# Patient Record
Sex: MALE | Race: WHITE | NOT HISPANIC OR LATINO | Employment: OTHER
[De-identification: names, ages, dates, MRNs, and addresses within clinical notes are randomized per-mention and may not be internally consistent; named-entity substitution may affect disease eponyms.]

---

## 2018-03-14 PROCEDURE — 82570 ASSAY OF URINE CREATININE: CPT | Performed by: INTERNAL MEDICINE

## 2018-03-14 PROCEDURE — 82043 UR ALBUMIN QUANTITATIVE: CPT | Performed by: INTERNAL MEDICINE

## 2018-05-15 ENCOUNTER — HOSPITAL (OUTPATIENT)
Dept: OTHER | Age: 58
End: 2018-05-15
Attending: EMERGENCY MEDICINE

## 2018-05-15 LAB
ALBUMIN SERPL-MCNC: 3.7 GM/DL (ref 3.6–5.1)
ALBUMIN/GLOB SERPL: 1.1 {RATIO} (ref 1–2.4)
ALP SERPL-CCNC: 80 UNIT/L (ref 45–117)
ALT SERPL-CCNC: 20 UNIT/L
ANALYZER ANC (IANC): ABNORMAL
ANION GAP SERPL CALC-SCNC: 11 MMOL/L (ref 10–20)
APPEARANCE UR: CLEAR
AST SERPL-CCNC: 8 UNIT/L
BASOPHILS # BLD: 0 THOUSAND/MCL (ref 0–0.3)
BASOPHILS NFR BLD: 0 %
BILIRUB SERPL-MCNC: 0.4 MG/DL (ref 0.2–1)
BILIRUB UR QL STRIP: ABNORMAL
BUN SERPL-MCNC: 19 MG/DL (ref 6–20)
BUN/CREAT SERPL: 26 (ref 7–25)
CALCIUM SERPL-MCNC: 9.2 MG/DL (ref 8.4–10.2)
CHLORIDE: 101 MMOL/L (ref 98–107)
CO2 SERPL-SCNC: 30 MMOL/L (ref 21–32)
COLOR UR: YELLOW
CREAT SERPL-MCNC: 0.73 MG/DL (ref 0.67–1.17)
DIFFERENTIAL METHOD BLD: ABNORMAL
EOSINOPHIL # BLD: 0.3 THOUSAND/MCL (ref 0.1–0.5)
EOSINOPHIL NFR BLD: 3 %
ERYTHROCYTE [DISTWIDTH] IN BLOOD: 12.7 % (ref 11–15)
ERYTHROCYTE [SEDIMENTATION RATE] IN BLOOD BY WESTERGREN METHOD: 9 MM/HR (ref 0–20)
GLOBULIN SER-MCNC: 3.4 GM/DL (ref 2–4)
GLUCOSE SERPL-MCNC: 177 MG/DL (ref 65–99)
GLUCOSE UR STRIP-MCNC: NEGATIVE MG/DL
HEMATOCRIT: 46.7 % (ref 39–51)
HEMOCCULT STL QL: NEGATIVE
HGB BLD-MCNC: 16.6 GM/DL (ref 13–17)
KETONES UR STRIP-MCNC: NEGATIVE MG/DL
LEUKOCYTE ESTERASE UR QL STRIP: NEGATIVE
LIPASE SERPL-CCNC: 182 UNIT/L (ref 73–393)
LYMPHOCYTES # BLD: 3.2 THOUSAND/MCL (ref 1–4)
LYMPHOCYTES NFR BLD: 31 %
MCH RBC QN AUTO: 31.5 PG (ref 26–34)
MCHC RBC AUTO-ENTMCNC: 35.5 GM/DL (ref 32–36.5)
MCV RBC AUTO: 88.6 FL (ref 78–100)
MONOCYTES # BLD: 1 THOUSAND/MCL (ref 0.3–0.9)
MONOCYTES NFR BLD: 10 %
NEUTROPHILS # BLD: 5.7 THOUSAND/MCL (ref 1.8–7.7)
NEUTROPHILS NFR BLD: 56 %
NEUTS SEG NFR BLD: ABNORMAL %
NITRITE UR QL STRIP: NEGATIVE
PERCENT NRBC: ABNORMAL
PH UR STRIP: 6.5 UNIT (ref 5–7)
PLATELET # BLD: 229 THOUSAND/MCL (ref 140–450)
POTASSIUM SERPL-SCNC: 4.1 MMOL/L (ref 3.4–5.1)
PROT SERPL-MCNC: 7.1 GM/DL (ref 6.4–8.2)
PROT UR STRIP-MCNC: 30 MG/DL
RBC # BLD: 5.27 MILLION/MCL (ref 4.5–5.9)
SODIUM SERPL-SCNC: 138 MMOL/L (ref 135–145)
SP GR UR STRIP: 1.02 (ref 1–1.03)
TROPONIN I SERPL HS-MCNC: <0.02 NG/ML
UROBILINOGEN UR STRIP-MCNC: 1 MG/DL (ref 0–1)
WBC # BLD: 10.1 THOUSAND/MCL (ref 4.2–11)

## 2018-05-16 ENCOUNTER — DIAGNOSTIC TRANS (OUTPATIENT)
Dept: OTHER | Age: 58
End: 2018-05-16

## 2018-07-20 ENCOUNTER — HOSPITAL (OUTPATIENT)
Dept: OTHER | Age: 58
End: 2018-07-20
Attending: EMERGENCY MEDICINE

## 2018-07-20 LAB
ALBUMIN SERPL-MCNC: 3.5 GM/DL (ref 3.6–5.1)
ALP SERPL-CCNC: 71 UNIT/L (ref 45–117)
ALT SERPL-CCNC: 17 UNIT/L
ANALYZER ANC (IANC): ABNORMAL
ANION GAP SERPL CALC-SCNC: 12 MMOL/L (ref 10–20)
AST SERPL-CCNC: 10 UNIT/L
BASOPHILS # BLD: 0 THOUSAND/MCL (ref 0–0.3)
BASOPHILS NFR BLD: 0 %
BILIRUB CONJ SERPL-MCNC: 0.1 MG/DL (ref 0–0.2)
BILIRUB SERPL-MCNC: 0.5 MG/DL (ref 0.2–1)
BUN SERPL-MCNC: 16 MG/DL (ref 6–20)
BUN/CREAT SERPL: 23 (ref 7–25)
CALCIUM SERPL-MCNC: 8.6 MG/DL (ref 8.4–10.2)
CHLORIDE: 104 MMOL/L (ref 98–107)
CO2 SERPL-SCNC: 29 MMOL/L (ref 21–32)
CREAT SERPL-MCNC: 0.7 MG/DL (ref 0.67–1.17)
DIFFERENTIAL METHOD BLD: ABNORMAL
EOSINOPHIL # BLD: 0.4 THOUSAND/MCL (ref 0.1–0.5)
EOSINOPHIL NFR BLD: 4 %
ERYTHROCYTE [DISTWIDTH] IN BLOOD: 12.5 % (ref 11–15)
GLUCOSE SERPL-MCNC: 141 MG/DL (ref 65–99)
HEMATOCRIT: 44.8 % (ref 39–51)
HGB BLD-MCNC: 16 GM/DL (ref 13–17)
LIPASE SERPL-CCNC: 75 UNIT/L (ref 73–393)
LYMPHOCYTES # BLD: 2.7 THOUSAND/MCL (ref 1–4)
LYMPHOCYTES NFR BLD: 28 %
MCH RBC QN AUTO: 32 PG (ref 26–34)
MCHC RBC AUTO-ENTMCNC: 35.7 GM/DL (ref 32–36.5)
MCV RBC AUTO: 89.6 FL (ref 78–100)
MONOCYTES # BLD: 1 THOUSAND/MCL (ref 0.3–0.9)
MONOCYTES NFR BLD: 10 %
NEUTROPHILS # BLD: 5.7 THOUSAND/MCL (ref 1.8–7.7)
NEUTROPHILS NFR BLD: 58 %
NEUTS SEG NFR BLD: ABNORMAL %
NRBC (NRBCRE): ABNORMAL
PLATELET # BLD: 203 THOUSAND/MCL (ref 140–450)
POTASSIUM SERPL-SCNC: 4.1 MMOL/L (ref 3.4–5.1)
PROT SERPL-MCNC: 7 GM/DL (ref 6.4–8.2)
RBC # BLD: 5 MILLION/MCL (ref 4.5–5.9)
SODIUM SERPL-SCNC: 141 MMOL/L (ref 135–145)
WBC # BLD: 9.8 THOUSAND/MCL (ref 4.2–11)

## 2018-07-21 LAB
APPEARANCE UR: CLEAR
BILIRUB UR QL STRIP: NEGATIVE
COLOR UR: YELLOW
GLUCOSE UR STRIP-MCNC: 100 MG/DL
HEMOCCULT STL QL: NEGATIVE
KETONES UR STRIP-MCNC: NEGATIVE MG/DL
LEUKOCYTE ESTERASE UR QL STRIP: NEGATIVE
NITRITE UR QL STRIP: NEGATIVE
PH UR STRIP: 6 UNIT (ref 5–7)
PROT UR STRIP-MCNC: NEGATIVE MG/DL
SP GR UR STRIP: 1.02 (ref 1–1.03)
UROBILINOGEN UR STRIP-MCNC: 0.2 MG/DL (ref 0–1)

## 2018-11-06 PROCEDURE — 86334 IMMUNOFIX E-PHORESIS SERUM: CPT | Performed by: OTHER

## 2018-11-06 PROCEDURE — 84165 PROTEIN E-PHORESIS SERUM: CPT | Performed by: OTHER

## 2018-11-06 PROCEDURE — 83883 ASSAY NEPHELOMETRY NOT SPEC: CPT | Performed by: OTHER

## 2019-07-08 PROBLEM — Z28.20 VACCINE REFUSED BY PATIENT: Status: ACTIVE | Noted: 2019-07-08

## 2019-07-08 PROBLEM — Z28.21 REFUSED PNEUMOCOCCAL VACCINE: Status: ACTIVE | Noted: 2019-07-08

## 2019-07-08 PROBLEM — Z00.00 ROUTINE GENERAL MEDICAL EXAMINATION AT A HEALTH CARE FACILITY: Status: ACTIVE | Noted: 2019-07-08

## 2019-07-08 PROBLEM — Z53.20 COLONOSCOPY REFUSED: Status: ACTIVE | Noted: 2019-07-08

## 2019-07-11 PROBLEM — E11.65 UNCONTROLLED TYPE 2 DIABETES MELLITUS WITH HYPERGLYCEMIA (HCC): Status: ACTIVE | Noted: 2019-07-11

## 2019-07-12 PROCEDURE — 86803 HEPATITIS C AB TEST: CPT | Performed by: NURSE PRACTITIONER

## 2021-01-13 ENCOUNTER — APPOINTMENT (OUTPATIENT)
Dept: ULTRASOUND IMAGING | Facility: HOSPITAL | Age: 61
End: 2021-01-13
Attending: EMERGENCY MEDICINE
Payer: MEDICARE

## 2021-01-13 ENCOUNTER — APPOINTMENT (OUTPATIENT)
Dept: GENERAL RADIOLOGY | Facility: HOSPITAL | Age: 61
End: 2021-01-13
Attending: EMERGENCY MEDICINE
Payer: MEDICARE

## 2021-01-13 ENCOUNTER — HOSPITAL ENCOUNTER (EMERGENCY)
Facility: HOSPITAL | Age: 61
Discharge: HOME | End: 2021-01-13
Attending: EMERGENCY MEDICINE
Payer: MEDICARE

## 2021-01-13 VITALS
HEART RATE: 106 BPM | DIASTOLIC BLOOD PRESSURE: 91 MMHG | HEIGHT: 71 IN | TEMPERATURE: 98 F | RESPIRATION RATE: 22 BRPM | OXYGEN SATURATION: 98 % | WEIGHT: 200 LBS | SYSTOLIC BLOOD PRESSURE: 146 MMHG | BODY MASS INDEX: 28 KG/M2

## 2021-01-13 DIAGNOSIS — M25.562 ARTHRALGIA OF LEFT LOWER LEG: Primary | ICD-10-CM

## 2021-01-13 DIAGNOSIS — E11.65 TYPE 2 DIABETES MELLITUS WITH HYPERGLYCEMIA, WITHOUT LONG-TERM CURRENT USE OF INSULIN (HCC): ICD-10-CM

## 2021-01-13 LAB
ALBUMIN SERPL-MCNC: 3.5 G/DL (ref 3.4–5)
ALBUMIN/GLOB SERPL: 0.8 {RATIO} (ref 1–2)
ALP LIVER SERPL-CCNC: 102 U/L
ALT SERPL-CCNC: 16 U/L
ANION GAP SERPL CALC-SCNC: 6 MMOL/L (ref 0–18)
AST SERPL-CCNC: 10 U/L (ref 15–37)
BASOPHILS # BLD AUTO: 0.05 X10(3) UL (ref 0–0.2)
BASOPHILS NFR BLD AUTO: 0.4 %
BILIRUB SERPL-MCNC: 0.5 MG/DL (ref 0.1–2)
BUN BLD-MCNC: 10 MG/DL (ref 7–18)
BUN/CREAT SERPL: 10.9 (ref 10–20)
CALCIUM BLD-MCNC: 9.1 MG/DL (ref 8.5–10.1)
CHLORIDE SERPL-SCNC: 100 MMOL/L (ref 98–112)
CO2 SERPL-SCNC: 27 MMOL/L (ref 21–32)
CREAT BLD-MCNC: 0.92 MG/DL
DEPRECATED RDW RBC AUTO: 41.1 FL (ref 35.1–46.3)
EOSINOPHIL # BLD AUTO: 0.36 X10(3) UL (ref 0–0.7)
EOSINOPHIL NFR BLD AUTO: 3 %
ERYTHROCYTE [DISTWIDTH] IN BLOOD BY AUTOMATED COUNT: 13.2 % (ref 11–15)
GLOBULIN PLAS-MCNC: 4.2 G/DL (ref 2.8–4.4)
GLUCOSE BLD-MCNC: 308 MG/DL (ref 70–99)
GLUCOSE BLD-MCNC: 65 MG/DL (ref 70–99)
HCT VFR BLD AUTO: 54.2 %
HGB BLD-MCNC: 18.5 G/DL
IMM GRANULOCYTES # BLD AUTO: 0.07 X10(3) UL (ref 0–1)
IMM GRANULOCYTES NFR BLD: 0.6 %
LYMPHOCYTES # BLD AUTO: 2.26 X10(3) UL (ref 1–4)
LYMPHOCYTES NFR BLD AUTO: 19 %
M PROTEIN MFR SERPL ELPH: 7.7 G/DL (ref 6.4–8.2)
MCH RBC QN AUTO: 29.8 PG (ref 26–34)
MCHC RBC AUTO-ENTMCNC: 34.1 G/DL (ref 31–37)
MCV RBC AUTO: 87.3 FL
MONOCYTES # BLD AUTO: 1.08 X10(3) UL (ref 0.1–1)
MONOCYTES NFR BLD AUTO: 9.1 %
NEUTROPHILS # BLD AUTO: 8.06 X10 (3) UL (ref 1.5–7.7)
NEUTROPHILS # BLD AUTO: 8.06 X10(3) UL (ref 1.5–7.7)
NEUTROPHILS NFR BLD AUTO: 67.9 %
OSMOLALITY SERPL CALC.SUM OF ELEC: 287 MOSM/KG (ref 275–295)
PLATELET # BLD AUTO: 188 10(3)UL (ref 150–450)
POTASSIUM SERPL-SCNC: 4.2 MMOL/L (ref 3.5–5.1)
RBC # BLD AUTO: 6.21 X10(6)UL
SODIUM SERPL-SCNC: 133 MMOL/L (ref 136–145)
WBC # BLD AUTO: 11.9 X10(3) UL (ref 4–11)

## 2021-01-13 PROCEDURE — 93971 EXTREMITY STUDY: CPT | Performed by: EMERGENCY MEDICINE

## 2021-01-13 PROCEDURE — 96374 THER/PROPH/DIAG INJ IV PUSH: CPT

## 2021-01-13 PROCEDURE — 99285 EMERGENCY DEPT VISIT HI MDM: CPT

## 2021-01-13 PROCEDURE — 96361 HYDRATE IV INFUSION ADD-ON: CPT

## 2021-01-13 PROCEDURE — 82962 GLUCOSE BLOOD TEST: CPT

## 2021-01-13 PROCEDURE — 80053 COMPREHEN METABOLIC PANEL: CPT | Performed by: EMERGENCY MEDICINE

## 2021-01-13 PROCEDURE — 73590 X-RAY EXAM OF LOWER LEG: CPT | Performed by: EMERGENCY MEDICINE

## 2021-01-13 PROCEDURE — 96376 TX/PRO/DX INJ SAME DRUG ADON: CPT

## 2021-01-13 PROCEDURE — 85025 COMPLETE CBC W/AUTO DIFF WBC: CPT | Performed by: EMERGENCY MEDICINE

## 2021-01-13 PROCEDURE — 93005 ELECTROCARDIOGRAM TRACING: CPT

## 2021-01-13 PROCEDURE — 93010 ELECTROCARDIOGRAM REPORT: CPT

## 2021-01-13 RX ORDER — INSULIN ASPART 100 [IU]/ML
0.15 INJECTION, SOLUTION INTRAVENOUS; SUBCUTANEOUS ONCE
Status: COMPLETED | OUTPATIENT
Start: 2021-01-13 | End: 2021-01-13

## 2021-01-13 RX ORDER — HYDROMORPHONE HYDROCHLORIDE 1 MG/ML
0.5 INJECTION, SOLUTION INTRAMUSCULAR; INTRAVENOUS; SUBCUTANEOUS EVERY 30 MIN PRN
Status: DISCONTINUED | OUTPATIENT
Start: 2021-01-13 | End: 2021-01-13

## 2021-01-13 RX ORDER — SODIUM CHLORIDE 9 MG/ML
125 INJECTION, SOLUTION INTRAVENOUS CONTINUOUS
Status: DISCONTINUED | OUTPATIENT
Start: 2021-01-13 | End: 2021-01-13

## 2021-01-13 NOTE — ED INITIAL ASSESSMENT (HPI)
Patient has had pain in left leg that has progressed since thanksgiving. Patient states that the pain is now traveling from his leg all the way up to his neck. Patient also thinks he had a seizure 2 days ago.

## 2021-01-14 LAB
ATRIAL RATE: 113 BPM
P AXIS: 78 DEGREES
P-R INTERVAL: 140 MS
Q-T INTERVAL: 334 MS
QRS DURATION: 92 MS
QTC CALCULATION (BEZET): 458 MS
R AXIS: 66 DEGREES
T AXIS: 51 DEGREES
VENTRICULAR RATE: 113 BPM

## 2021-01-14 NOTE — ED NOTES
Pt not agreeable to accucheck being done. Pt getting dressed and states he needs another dose of pain medication before he goes home.  RN asks patient if he has a ride home as pain medication can not be given unless pt has a ride home that is visualized by

## 2021-01-14 NOTE — ED NOTES
Pt pulled call light after finishing in the restroom.  Pt stated he did not want to go back the shortest direction to his room because it would require him to look at the PCT who \"touched his face without gloves on\", followed by name calling of various co

## 2021-01-14 NOTE — ED NOTES
After walking in to the pt room I introduced myself and started to put gloves on to take an accucheck. The pt mask was under his nose and I asked him to pull it up, The pt stated he needed more pain meds.  I let the pt know I would inform the nurse right af

## 2021-01-14 NOTE — ED NOTES
This CL/Charge RN asked to speak with patient regarding a concern. Patient using obscene language regarding situation and staff, calling staff names, stating he does not care about the use of his language.  States upset with PCT when patient mask was not [facility]

## 2021-01-14 NOTE — ED PROVIDER NOTES
Patient Seen in: [facility] Emergency Department      History   Patient presents with:  Pain    Stated Complaint: pain to leg    HPI/Subjective:   HPI    Patient is a 68-year-old male who has a history of bilateral leg pain secondary to diabetic ying [01/13/21 1625]   BP (!) 180/156   Pulse 120   Resp 20   Temp 98.1 °F (36.7 °C)   Temp src Temporal   SpO2 98 %   O2 Device None (Room air)       Current:BP (!) 146/91   Pulse 106   Temp 98.1 °F (36.7 °C) (Temporal)   Resp 22   Ht 180.3 cm (5' 11\")   Wt 9 orders were created for panel order CBC WITH DIFFERENTIAL WITH PLATELET.   Procedure                               Abnormality         Status                     ---------                               -----------         ------                     CBC W/ D days      Cesar Beckford MD  [address] [facility] [address] [phone]    In 2 days            Medications Prescribed:  Discharge Medication List as of 1/13/2021  8:14 PM

## 2021-11-22 ENCOUNTER — APPOINTMENT (OUTPATIENT)
Dept: GENERAL RADIOLOGY | Facility: HOSPITAL | Age: 61
DRG: 066 | End: 2021-11-22
Attending: EMERGENCY MEDICINE
Payer: MEDICARE

## 2021-11-22 ENCOUNTER — APPOINTMENT (OUTPATIENT)
Dept: MRI IMAGING | Facility: HOSPITAL | Age: 61
DRG: 066 | End: 2021-11-22
Attending: EMERGENCY MEDICINE
Payer: MEDICARE

## 2021-11-22 ENCOUNTER — APPOINTMENT (OUTPATIENT)
Dept: CT IMAGING | Facility: HOSPITAL | Age: 61
DRG: 066 | End: 2021-11-22
Attending: EMERGENCY MEDICINE
Payer: MEDICARE

## 2021-11-22 ENCOUNTER — HOSPITAL ENCOUNTER (INPATIENT)
Facility: HOSPITAL | Age: 61
LOS: 1 days | Discharge: LEFT BEFORE DISCHARGE | DRG: 066 | End: 2021-11-23
Attending: EMERGENCY MEDICINE | Admitting: INTERNAL MEDICINE
Payer: MEDICARE

## 2021-11-22 DIAGNOSIS — I63.9 ACUTE CVA (CEREBROVASCULAR ACCIDENT) (HCC): Primary | ICD-10-CM

## 2021-11-22 PROCEDURE — 70546 MR ANGIOGRAPH HEAD W/O&W/DYE: CPT | Performed by: EMERGENCY MEDICINE

## 2021-11-22 PROCEDURE — 71045 X-RAY EXAM CHEST 1 VIEW: CPT | Performed by: EMERGENCY MEDICINE

## 2021-11-22 PROCEDURE — 70450 CT HEAD/BRAIN W/O DYE: CPT | Performed by: EMERGENCY MEDICINE

## 2021-11-22 PROCEDURE — 70553 MRI BRAIN STEM W/O & W/DYE: CPT | Performed by: EMERGENCY MEDICINE

## 2021-11-22 PROCEDURE — 70549 MR ANGIOGRAPH NECK W/O&W/DYE: CPT | Performed by: EMERGENCY MEDICINE

## 2021-11-22 RX ORDER — DEXTROSE MONOHYDRATE 25 G/50ML
50 INJECTION, SOLUTION INTRAVENOUS
Status: DISCONTINUED | OUTPATIENT
Start: 2021-11-22 | End: 2021-11-24

## 2021-11-22 RX ORDER — HYDROCODONE BITARTRATE AND ACETAMINOPHEN 5; 325 MG/1; MG/1
1 TABLET ORAL EVERY 4 HOURS PRN
Status: DISCONTINUED | OUTPATIENT
Start: 2021-11-22 | End: 2021-11-24

## 2021-11-22 RX ORDER — ACETAMINOPHEN 325 MG/1
650 TABLET ORAL EVERY 4 HOURS PRN
Status: DISCONTINUED | OUTPATIENT
Start: 2021-11-22 | End: 2021-11-24

## 2021-11-22 RX ORDER — ONDANSETRON 2 MG/ML
4 INJECTION INTRAMUSCULAR; INTRAVENOUS EVERY 4 HOURS PRN
Status: DISCONTINUED | OUTPATIENT
Start: 2021-11-22 | End: 2021-11-22

## 2021-11-22 RX ORDER — HYDROCODONE BITARTRATE AND ACETAMINOPHEN 5; 325 MG/1; MG/1
2 TABLET ORAL EVERY 4 HOURS PRN
Status: DISCONTINUED | OUTPATIENT
Start: 2021-11-22 | End: 2021-11-24

## 2021-11-22 RX ORDER — NICOTINE 21 MG/24HR
1 PATCH, TRANSDERMAL 24 HOURS TRANSDERMAL DAILY
Status: DISCONTINUED | OUTPATIENT
Start: 2021-11-22 | End: 2021-11-24

## 2021-11-22 RX ORDER — HYDRALAZINE HYDROCHLORIDE 20 MG/ML
10 INJECTION INTRAMUSCULAR; INTRAVENOUS EVERY 2 HOUR PRN
Status: DISCONTINUED | OUTPATIENT
Start: 2021-11-22 | End: 2021-11-24

## 2021-11-22 RX ORDER — SODIUM CHLORIDE 9 MG/ML
INJECTION, SOLUTION INTRAVENOUS CONTINUOUS
Status: DISCONTINUED | OUTPATIENT
Start: 2021-11-22 | End: 2021-11-23

## 2021-11-22 RX ORDER — ASPIRIN 300 MG
300 SUPPOSITORY, RECTAL RECTAL ONCE
Status: COMPLETED | OUTPATIENT
Start: 2021-11-22 | End: 2021-11-22

## 2021-11-22 RX ORDER — ASPIRIN 81 MG/1
324 TABLET, CHEWABLE ORAL ONCE
Status: DISCONTINUED | OUTPATIENT
Start: 2021-11-22 | End: 2021-11-22

## 2021-11-22 RX ORDER — DOCUSATE SODIUM 100 MG/1
100 CAPSULE, LIQUID FILLED ORAL 2 TIMES DAILY PRN
Status: DISCONTINUED | OUTPATIENT
Start: 2021-11-22 | End: 2021-11-24

## 2021-11-22 RX ORDER — ONDANSETRON 2 MG/ML
4 INJECTION INTRAMUSCULAR; INTRAVENOUS EVERY 6 HOURS PRN
Status: DISCONTINUED | OUTPATIENT
Start: 2021-11-22 | End: 2021-11-24

## 2021-11-22 RX ORDER — ASPIRIN 325 MG
325 TABLET ORAL DAILY
Status: DISCONTINUED | OUTPATIENT
Start: 2021-11-23 | End: 2021-11-24

## 2021-11-22 RX ORDER — ASPIRIN 300 MG
300 SUPPOSITORY, RECTAL RECTAL DAILY
Status: DISCONTINUED | OUTPATIENT
Start: 2021-11-23 | End: 2021-11-24

## 2021-11-22 RX ORDER — ACETAMINOPHEN 650 MG/1
650 SUPPOSITORY RECTAL EVERY 4 HOURS PRN
Status: DISCONTINUED | OUTPATIENT
Start: 2021-11-22 | End: 2021-11-24

## 2021-11-22 RX ORDER — METOCLOPRAMIDE HYDROCHLORIDE 5 MG/ML
10 INJECTION INTRAMUSCULAR; INTRAVENOUS EVERY 8 HOURS PRN
Status: DISCONTINUED | OUTPATIENT
Start: 2021-11-22 | End: 2021-11-24

## 2021-11-22 RX ORDER — LABETALOL HYDROCHLORIDE 5 MG/ML
10 INJECTION, SOLUTION INTRAVENOUS EVERY 10 MIN PRN
Status: DISCONTINUED | OUTPATIENT
Start: 2021-11-22 | End: 2021-11-24

## 2021-11-22 NOTE — SLP NOTE
ADULT SWALLOWING EVALUATION    ASSESSMENT    ASSESSMENT/OVERALL IMPRESSION:  Patient is a 58 y/o male admitted with stroke-like symptoms and PMHx significant for MI and HLD. SLP order received to evaluate and treat d/t failed RN dysphagia screen.  Patient d Renal disorder    • Spongiotic dermatitis 10/7/2013          Diet Prior to Admission: Regular; Thin liquids       Patient/Family Goals: none stated    SWALLOWING HISTORY  Current Diet Consistency: NPO  Dysphagia History: as above  Imaging Results:   CXR 11/ contact Chucho Degree, SLP

## 2021-11-22 NOTE — ED QUICK NOTES
Orders for admission, patient is aware of plan and ready to go upstairs. Any questions, please call ED RN mariah  at extension [phone]. Vaccinated?  yes  Type of COVID test sent: rapid  COVID Suspicion level: Low      Titratable drug(s) infusing: none  Ra

## 2021-11-22 NOTE — ED INITIAL ASSESSMENT (HPI)
Pt here due to may be having a stroke last Friday. Pt stated that after being at the dentist he started having problems with being able to hold anything with his right hand. Today he can not use the right hand.

## 2021-11-22 NOTE — H&P
[facility] Hospitalist H&P       CC: Patient presents with:  Altered Mental Status  Stroke       PCP: Fernando Walsh MD    History of Present Illness: 60 y/o m w hx of dm, smoking p/w c/o right hand weakness, began 1 day ago, no fall, no cp/sob, some slurrin reviewed and negative except for what's stated above.      OBJECTIVE:  /90   Pulse 107   Temp 98.7 °F (37.1 °C) (Temporal)   Resp 16   Ht 5' 10\" (1.778 m)   Wt 210 lb (95.3 kg)   SpO2 97%   BMI 30.13 kg/m²   General:  Alert, no distress   Head:  Norm 11/22/2021  CONCLUSION:  Exam is within normal limits.     Dictated by (CST): Scar Saldivar DO on 11/22/2021 at 11:55 AM     Finalized by (CST): Scar Saldivar DO on 11/22/2021 at 11:56 AM           ASSESSMENT / PLAN:   60 y/o m w hx of dm, smoking p/w c/o r

## 2021-11-22 NOTE — ED PROVIDER NOTES
Patient Seen in: [facility] Emergency Department      History   Patient presents with:  Altered Mental Status  Stroke    Stated Complaint: R arm numbness since Thursday, confusion x 48 hrs    Subjective:   HPI    10-year-old male with past medical h reviewed and negative except as noted above.     Physical Exam     ED Triage Vitals [11/22/21 1114]   BP (!) 154/98   Pulse 108   Resp 18   Temp 98.7 °F (37.1 °C)   Temp src Temporal   SpO2 98 %   O2 Device None (Room air)       Current:BP (!) 146/91   Puls Neutrophil Absolute Prelim 8.79 (*)     Neutrophil Absolute 8.79 (*)     All other components within normal limits   ETHYL ALCOHOL - Normal   PROTHROMBIN TIME (PT) - Normal   PTT, ACTIVATED - Normal   RAPID SARS-COV-2 BY PCR - Normal   CBC WITH DIFFERENTIA potential venous thrombosis without MR venogram.  If clinically indicated, consider MR venogram.  No acute intracranial hemorrhage. No midline shift, mass effect, hydrocephalus, herniation, extra-axial fluid collections.   Mild generalized cerebral and cer some slurred speech. He seems to be communicating appropriately and understanding when spoken to as well. I do not appreciate any visual field cuts at this time. Plan for CT of the head to assess for intracranial normality such as CVA.  Will obtain basic la

## 2021-11-23 ENCOUNTER — APPOINTMENT (OUTPATIENT)
Dept: CV DIAGNOSTICS | Facility: HOSPITAL | Age: 61
DRG: 066 | End: 2021-11-23
Attending: HOSPITALIST
Payer: MEDICARE

## 2021-11-23 VITALS
SYSTOLIC BLOOD PRESSURE: 120 MMHG | HEART RATE: 96 BPM | TEMPERATURE: 99 F | RESPIRATION RATE: 24 BRPM | DIASTOLIC BLOOD PRESSURE: 68 MMHG | HEIGHT: 70 IN | OXYGEN SATURATION: 95 % | WEIGHT: 210 LBS | BODY MASS INDEX: 30.06 KG/M2

## 2021-11-23 PROCEDURE — 93306 TTE W/DOPPLER COMPLETE: CPT | Performed by: HOSPITALIST

## 2021-11-23 PROCEDURE — 99223 1ST HOSP IP/OBS HIGH 75: CPT | Performed by: OTHER

## 2021-11-23 RX ORDER — NITROGLYCERIN 0.4 MG/1
TABLET SUBLINGUAL
Status: COMPLETED
Start: 2021-11-23 | End: 2021-11-23

## 2021-11-23 NOTE — PHYSICAL THERAPY NOTE
PT lisha attempted this AM. During subjective pt reported L sided chest pain with centralized tingling. RN notified. Will hold until appropriate. Pt lives alone on the third floor without an elevator. Does not use or own any assistive devices.   Sister p

## 2021-11-23 NOTE — CONSULTS
[id number] [facility] Consultation Note    Date of consult: 11/23/2021    Reason for consult: stroke    HPI: Milton Jones is a 59year old male with past medical history of coronary artery disease, diabetes, tobacco use presents today for ri injection 10 mg, 10 mg, Intravenous, Q2H PRN  docusate sodium (COLACE) cap 100 mg, 100 mg, Oral, BID PRN  ondansetron (ZOFRAN) injection 4 mg, 4 mg, Intravenous, Q6H PRN   Or  metoclopramide (REGLAN) injection 10 mg, 10 mg, Intravenous, Q8H PRN  aspirin 30 rashes; no edema  Neurological Examination:  Language: Fluency with normal naming and repetition, comprehension normal  Speech: no dysarthria  CN: II-XII pupils 2-3 mm equal and reactive to light, mild facial asymetry  Motor strength: right UE subtle weakn

## 2021-11-23 NOTE — PLAN OF CARE
Assumed care at 1930  Pt A&Ox4   RA  NSR/ST on tele , VSS  Mild hip pain   No acute neuro changes   Sepsis BPA fired, hospitalist notified no new orders at this time   POC discussed with pt   Call light in reach, pt resting comfortably, will continue to mo

## 2021-11-23 NOTE — PROGRESS NOTES
[facility] Hospitalist Progress Note     CC: Hospital Follow up    PCP: Horacio Lucas MD       Assessment/Plan:     Principal Problem:    Acute CVA (cerebrovascular accident) ([facility] [address]    58 y/o m w hx of dm, smoking p/w c/o right hand weakness     Right hand wea NEPRELIM 8.79*  --    WBC 12.9* 10.0   .0 177.0         Recent Labs   Lab 11/22/21  1113   *   BUN 11   CREATSERUM 0.92   GFRAA 102   GFRNAA 88   CA 9.2      K 3.8      CO2 23.0       Recent Labs   Lab 11/22/21  1113   ALT 25 were discussed with Michelle Keating RN at 1645 hours on 11/22/2021. Critical results were read back.    Dictated by (CST): Golden Gonzalez MD on 11/22/2021 at 4:21 PM     Finalized by (CST): Golden Gonzalez MD on 11/22/2021 at 4:48 PM           Meds:     • Insu

## 2021-11-23 NOTE — PLAN OF CARE
NURSING ADMISSION NOTE      Patient admitted via Cart  Oriented to room. Safety precautions initiated. Bed in low position. Call light in reach.     Received patient A/Randi, ILA, BROOKS on tele around 1700  Admission navigator completed   Neuro barb maya

## 2021-11-23 NOTE — OCCUPATIONAL THERAPY NOTE
OCCUPATIONAL THERAPY                            Patient received in sitting, attempting to get out of bed. He was able to express himself with some word finding difficulty noted. Patient complained about RUE being weak and slow.   OT educated pa

## 2021-11-23 NOTE — PAYOR COMM NOTE
--------------  ADMISSION REVIEW     Fiorella Adams MA [facility]  Subscriber #:  [id number]  Authorization Number: [id number]    Admit date: 11/22/21  Admit time:  4:31 PM       REVIEW DOCUMENTATION:          Acute Left frontal Stroke  Sinus tachycardia  Chest pain asymmetrically smaller vessel compared to the prominent right V4 segment.                ED Provider Notes      ED Provider Notes signed by Ariana Mcnamara MD at 11/22/2021 12:51 PM     Author: Ariana Mcnamara MD Service: Kristen Iyer 1.50        Types: Cigarettes      Smokeless tobacco: Never Used      Tobacco comment: 1-2 PACKS DAILY    Vaping Use      Vaping Use: Never used    Alcohol use: Yes    Drug use: Not Currently             Review of Systems    Positive for stated complaint: components:       Result Value    Glucose 193 (*)     AST 11 (*)     A/G Ratio 0.8 (*)     All other components within normal limits   POCT GLUCOSE - Abnormal; Notable for the following components:    POC Glucose 191 (*)     All other components within nor convexity for example series 3, images 41-45, may be also involving the precentral gyrus. The sulci appear effaced with blurring of normally sharp margins. Concerning for recent infarct. Probable old subcortical infarct left frontal lobe image 35.   Dens on 11/22/2021 at 11:56 AM           MDM      42-year-old male presents today for evaluation of right-sided weakness. Patient's last normal was Thursday, approximately 3 days ago. His NIH stroke scale is 3.  Patient with some new sensory deficits on the rig 300 mg     Date Action Dose Route User    11/22/2021 1251 Given 300 mg Rectal Ken Falk, RN      aspirin tab 325 mg     Date Action Dose Route User    11/23/2021 0828 Given 325 mg Oral Dinorah Guzman, RN      Gadoterate Meglumine (DOTAREM) 10 Keo Rao °F (36.4 °C) 100 22 118/73 — — — — AB    11/22/21 2300 97.8 °F (36.6 °C) 102 25 109/60 95 % — None (Room air) — SV    11/22/21 2100 98 °F (36.7 °C) 113 22 133/72 95 % — None (Room air) — KR    11/22/21 1900 97.9 °F (36.6 °C) 110 22 137/82 94 % — None (Room

## 2021-11-23 NOTE — CONSULTS
[address] [facility]  Report of Consultation    Roderick Shore Patient Status:  Inpatient    3/24/1960 MRN [id number]   [facility] 7NE-A Attending Augusto Mendoza MD   Hosp Day # 1 PCP You Cohen MD smoking cigarettes. He has been smoking about 1.50 packs per day. He has never used smokeless tobacco. He reports current alcohol use. He reports previous drug use.     Allergies:    Enalapril               FATIGUE, ANXIETY  Lipitor [Atorvastat*    OTHER (S shortness of breath. Cardiovascular: Positive for chest pain and palpitations. Negative for leg swelling. Gastrointestinal: Negative for nausea, vomiting, diarrhea and abdominal distention. Genitourinary: Negative for dysuria.    Musculoskeletal: Neg Component Value Date    INR 0.94 11/22/2021     Lab Results   Component Value Date    TROP <0.017 04/01/2019         CT BRAIN OR HEAD (13636)    Result Date: 11/22/2021  CONCLUSION:  Suspicion for recent left-sided cerebral infarct as described.   Conside Finalized by (CST): Omar Cason MD on 11/22/2021 at 4:48 PM            Assessment and Plan    Acute Left frontal Stroke  Sinus tachycardia  Chest pain  CAD  Tobacco abuse  dyslpidemia    PLAn  -needs MCT to assess for underlying afib, given suspiciou

## 2021-11-24 NOTE — PLAN OF CARE
NURSING DISCHARGE NOTE    Pt behavior escalated, WANDER called, pt verbally abusive to staff. Pt alert and oriented x4, neuro check intact. Education of stroke and cardiology work up given with pt verbalizing understanding of risks if leaving.  Pt refused

## 2021-11-24 NOTE — PLAN OF CARE
Assumed care @0730   AxOx4, RA, NSR/ST  No new neuro deficits, baseline throughout shift   Echo performed  Chest pain reported in afternoon, stable vitals, cardiology aware  12 lead EKG performed, nitroglycerin given, troponin levels drawn  Large, loose st

## 2021-11-26 NOTE — PAYOR COMM NOTE
Discharge Notification    Patient Name: Xochitl Marie Dayton [facility]  Subscriber #: [id number]  Authorization Number: [id number]  Admit Date/Time: 11/22/2021 10:56 AM  Discharge Date/Time: 11/23/2021 9:20 PM

## 2022-08-15 ENCOUNTER — APPOINTMENT (OUTPATIENT)
Dept: MRI IMAGING | Age: 62
End: 2022-08-15
Attending: PSYCHIATRY & NEUROLOGY

## 2022-08-15 ENCOUNTER — HOSPITAL ENCOUNTER (EMERGENCY)
Age: 62
Discharge: HOME | End: 2022-08-15
Attending: EMERGENCY MEDICINE

## 2022-08-15 VITALS
HEIGHT: 71 IN | OXYGEN SATURATION: 98 % | RESPIRATION RATE: 18 BRPM | WEIGHT: 233.03 LBS | BODY MASS INDEX: 32.62 KG/M2 | DIASTOLIC BLOOD PRESSURE: 86 MMHG | HEART RATE: 93 BPM | TEMPERATURE: 98.1 F | SYSTOLIC BLOOD PRESSURE: 132 MMHG

## 2022-08-15 DIAGNOSIS — R79.89 ELEVATED TROPONIN: ICD-10-CM

## 2022-08-15 DIAGNOSIS — R46.89 AGGRESSIVE BEHAVIOR OF ADULT: ICD-10-CM

## 2022-08-15 DIAGNOSIS — Z79.4 UNCONTROLLED DIABETES MELLITUS WITH HYPERGLYCEMIA, WITH LONG-TERM CURRENT USE OF INSULIN (CMD): ICD-10-CM

## 2022-08-15 DIAGNOSIS — G51.0 BELL'S PALSY: Primary | ICD-10-CM

## 2022-08-15 DIAGNOSIS — E11.65 UNCONTROLLED DIABETES MELLITUS WITH HYPERGLYCEMIA, WITH LONG-TERM CURRENT USE OF INSULIN (CMD): ICD-10-CM

## 2022-08-15 DIAGNOSIS — R29.810 FACIAL DROOP: ICD-10-CM

## 2022-08-15 PROBLEM — I10 HTN (HYPERTENSION): Status: ACTIVE | Noted: 2022-08-15

## 2022-08-15 PROBLEM — I63.9 ACUTE CVA (CEREBROVASCULAR ACCIDENT) (CMD): Status: ACTIVE | Noted: 2021-11-22

## 2022-08-15 LAB
ALBUMIN SERPL-MCNC: 3.6 G/DL (ref 3.6–5.1)
ALBUMIN/GLOB SERPL: 0.9 {RATIO} (ref 1–2.4)
ALP SERPL-CCNC: 85 UNITS/L (ref 45–117)
ALT SERPL-CCNC: 34 UNITS/L
ANION GAP SERPL CALC-SCNC: 11 MMOL/L (ref 7–19)
APTT PPP: 27 SEC (ref 22–30)
AST SERPL-CCNC: 16 UNITS/L
BASOPHILS # BLD: 0.1 K/MCL (ref 0–0.3)
BASOPHILS NFR BLD: 1 %
BILIRUB SERPL-MCNC: 0.7 MG/DL (ref 0.2–1)
BUN SERPL-MCNC: 12 MG/DL (ref 6–20)
BUN/CREAT SERPL: 11 (ref 7–25)
CALCIUM SERPL-MCNC: 8.8 MG/DL (ref 8.4–10.2)
CHLORIDE SERPL-SCNC: 103 MMOL/L (ref 97–110)
CO2 SERPL-SCNC: 28 MMOL/L (ref 21–32)
CREAT SERPL-MCNC: 1.13 MG/DL (ref 0.67–1.17)
CRP SERPL-MCNC: <0.3 MG/DL
DEPRECATED RDW RBC: 43.7 FL (ref 39–50)
EOSINOPHIL # BLD: 0.3 K/MCL (ref 0–0.5)
EOSINOPHIL NFR BLD: 2 %
ERYTHROCYTE [DISTWIDTH] IN BLOOD: 13.2 % (ref 11–15)
ERYTHROCYTE [SEDIMENTATION RATE] IN BLOOD BY WESTERGREN METHOD: 29 MM/HR (ref 0–20)
ETHANOL SERPL-MCNC: NORMAL MG/DL
FASTING DURATION TIME PATIENT: ABNORMAL H
GFR SERPLBLD BASED ON 1.73 SQ M-ARVRAT: 73 ML/MIN
GLOBULIN SER-MCNC: 4.2 G/DL (ref 2–4)
GLUCOSE SERPL-MCNC: 242 MG/DL (ref 70–99)
HCT VFR BLD CALC: 56.8 % (ref 39–51)
HGB BLD-MCNC: 19.2 G/DL (ref 13–17)
IMM GRANULOCYTES # BLD AUTO: 0.1 K/MCL (ref 0–0.2)
IMM GRANULOCYTES # BLD: 1 %
INR PPP: 1.1
LYMPHOCYTES # BLD: 2.5 K/MCL (ref 1–4)
LYMPHOCYTES NFR BLD: 19 %
MCH RBC QN AUTO: 30.5 PG (ref 26–34)
MCHC RBC AUTO-ENTMCNC: 33.8 G/DL (ref 32–36.5)
MCV RBC AUTO: 90.2 FL (ref 78–100)
MONOCYTES # BLD: 1.1 K/MCL (ref 0.3–0.9)
MONOCYTES NFR BLD: 9 %
NEUTROPHILS # BLD: 9 K/MCL (ref 1.8–7.7)
NEUTROPHILS NFR BLD: 68 %
NRBC BLD MANUAL-RTO: 0 /100 WBC
PLATELET # BLD AUTO: 224 K/MCL (ref 140–450)
POTASSIUM SERPL-SCNC: 4.1 MMOL/L (ref 3.4–5.1)
PROT SERPL-MCNC: 7.8 G/DL (ref 6.4–8.2)
PROTHROMBIN TIME: 11.5 SEC (ref 9.7–11.8)
RAINBOW EXTRA TUBES HOLD SPECIMEN: NORMAL
RBC # BLD: 6.3 MIL/MCL (ref 4.5–5.9)
SARS-COV-2 RNA RESP QL NAA+PROBE: NOT DETECTED
SERVICE CMNT-IMP: NORMAL
SERVICE CMNT-IMP: NORMAL
SODIUM SERPL-SCNC: 138 MMOL/L (ref 135–145)
TROPONIN I SERPL DL<=0.01 NG/ML-MCNC: 153 NG/L
WBC # BLD: 13 K/MCL (ref 4.2–11)

## 2022-08-15 PROCEDURE — 85652 RBC SED RATE AUTOMATED: CPT | Performed by: PSYCHIATRY & NEUROLOGY

## 2022-08-15 PROCEDURE — 87635 SARS-COV-2 COVID-19 AMP PRB: CPT | Performed by: EMERGENCY MEDICINE

## 2022-08-15 PROCEDURE — 85025 COMPLETE CBC W/AUTO DIFF WBC: CPT | Performed by: EMERGENCY MEDICINE

## 2022-08-15 PROCEDURE — 36415 COLL VENOUS BLD VENIPUNCTURE: CPT

## 2022-08-15 PROCEDURE — C9803 HOPD COVID-19 SPEC COLLECT: HCPCS

## 2022-08-15 PROCEDURE — 99284 EMERGENCY DEPT VISIT MOD MDM: CPT

## 2022-08-15 PROCEDURE — 70544 MR ANGIOGRAPHY HEAD W/O DYE: CPT

## 2022-08-15 PROCEDURE — 85730 THROMBOPLASTIN TIME PARTIAL: CPT | Performed by: EMERGENCY MEDICINE

## 2022-08-15 PROCEDURE — G1004 CDSM NDSC: HCPCS

## 2022-08-15 PROCEDURE — 70553 MRI BRAIN STEM W/O & W/DYE: CPT

## 2022-08-15 PROCEDURE — 85610 PROTHROMBIN TIME: CPT | Performed by: EMERGENCY MEDICINE

## 2022-08-15 PROCEDURE — A9585 GADOBUTROL INJECTION: HCPCS | Performed by: PSYCHIATRY & NEUROLOGY

## 2022-08-15 PROCEDURE — 86140 C-REACTIVE PROTEIN: CPT | Performed by: PSYCHIATRY & NEUROLOGY

## 2022-08-15 PROCEDURE — 82077 ASSAY SPEC XCP UR&BREATH IA: CPT | Performed by: EMERGENCY MEDICINE

## 2022-08-15 PROCEDURE — 70549 MR ANGIOGRAPH NECK W/O&W/DYE: CPT

## 2022-08-15 PROCEDURE — 80053 COMPREHEN METABOLIC PANEL: CPT | Performed by: EMERGENCY MEDICINE

## 2022-08-15 PROCEDURE — 84484 ASSAY OF TROPONIN QUANT: CPT | Performed by: EMERGENCY MEDICINE

## 2022-08-15 PROCEDURE — 10002805 HB CONTRAST AGENT: Performed by: PSYCHIATRY & NEUROLOGY

## 2022-08-15 RX ORDER — SITAGLIPTIN AND METFORMIN HYDROCHLORIDE 1000; 50 MG/1; MG/1
1 TABLET, FILM COATED ORAL
COMMUNITY

## 2022-08-15 RX ORDER — LORAZEPAM 2 MG/1
2 TABLET ORAL
Status: DISCONTINUED | OUTPATIENT
Start: 2022-08-15 | End: 2022-08-15

## 2022-08-15 RX ORDER — GADOBUTROL 604.72 MG/ML
10 INJECTION INTRAVENOUS ONCE
Status: COMPLETED | OUTPATIENT
Start: 2022-08-15 | End: 2022-08-15

## 2022-08-15 RX ADMIN — GADOBUTROL 10 ML: 604.72 INJECTION INTRAVENOUS at 15:26
